# Patient Record
Sex: MALE | Race: BLACK OR AFRICAN AMERICAN | URBAN - METROPOLITAN AREA
[De-identification: names, ages, dates, MRNs, and addresses within clinical notes are randomized per-mention and may not be internally consistent; named-entity substitution may affect disease eponyms.]

---

## 2019-07-08 ENCOUNTER — EMERGENCY (EMERGENCY)
Facility: HOSPITAL | Age: 44
LOS: 1 days | Discharge: HOME | End: 2019-07-08
Attending: EMERGENCY MEDICINE | Admitting: EMERGENCY MEDICINE
Payer: COMMERCIAL

## 2019-07-08 VITALS
HEART RATE: 114 BPM | SYSTOLIC BLOOD PRESSURE: 191 MMHG | OXYGEN SATURATION: 97 % | DIASTOLIC BLOOD PRESSURE: 125 MMHG | TEMPERATURE: 98 F

## 2019-07-08 VITALS
HEART RATE: 83 BPM | DIASTOLIC BLOOD PRESSURE: 100 MMHG | RESPIRATION RATE: 15 BRPM | OXYGEN SATURATION: 100 % | SYSTOLIC BLOOD PRESSURE: 158 MMHG | TEMPERATURE: 97 F

## 2019-07-08 LAB
ALBUMIN SERPL ELPH-MCNC: 4.4 G/DL — SIGNIFICANT CHANGE UP (ref 3.3–5)
ALP SERPL-CCNC: 42 U/L — SIGNIFICANT CHANGE UP (ref 40–120)
ALT FLD-CCNC: 22 U/L — SIGNIFICANT CHANGE UP (ref 4–41)
ANION GAP SERPL CALC-SCNC: 11 MMO/L — SIGNIFICANT CHANGE UP (ref 7–14)
AST SERPL-CCNC: 25 U/L — SIGNIFICANT CHANGE UP (ref 4–40)
BASOPHILS # BLD AUTO: 0.05 K/UL — SIGNIFICANT CHANGE UP (ref 0–0.2)
BASOPHILS NFR BLD AUTO: 0.6 % — SIGNIFICANT CHANGE UP (ref 0–2)
BILIRUB SERPL-MCNC: 0.5 MG/DL — SIGNIFICANT CHANGE UP (ref 0.2–1.2)
BUN SERPL-MCNC: 11 MG/DL — SIGNIFICANT CHANGE UP (ref 7–23)
CALCIUM SERPL-MCNC: 9.4 MG/DL — SIGNIFICANT CHANGE UP (ref 8.4–10.5)
CHLORIDE SERPL-SCNC: 103 MMOL/L — SIGNIFICANT CHANGE UP (ref 98–107)
CO2 SERPL-SCNC: 25 MMOL/L — SIGNIFICANT CHANGE UP (ref 22–31)
CREAT SERPL-MCNC: 1.4 MG/DL — HIGH (ref 0.5–1.3)
D DIMER BLD IA.RAPID-MCNC: < 150 NG/ML — SIGNIFICANT CHANGE UP
EOSINOPHIL # BLD AUTO: 0.19 K/UL — SIGNIFICANT CHANGE UP (ref 0–0.5)
EOSINOPHIL NFR BLD AUTO: 2.2 % — SIGNIFICANT CHANGE UP (ref 0–6)
GLUCOSE SERPL-MCNC: 103 MG/DL — HIGH (ref 70–99)
HCT VFR BLD CALC: 39.4 % — SIGNIFICANT CHANGE UP (ref 39–50)
HGB BLD-MCNC: 13.1 G/DL — SIGNIFICANT CHANGE UP (ref 13–17)
IMM GRANULOCYTES NFR BLD AUTO: 0.6 % — SIGNIFICANT CHANGE UP (ref 0–1.5)
LYMPHOCYTES # BLD AUTO: 2.45 K/UL — SIGNIFICANT CHANGE UP (ref 1–3.3)
LYMPHOCYTES # BLD AUTO: 28.9 % — SIGNIFICANT CHANGE UP (ref 13–44)
MAGNESIUM SERPL-MCNC: 2 MG/DL — SIGNIFICANT CHANGE UP (ref 1.6–2.6)
MCHC RBC-ENTMCNC: 26.5 PG — LOW (ref 27–34)
MCHC RBC-ENTMCNC: 33.2 % — SIGNIFICANT CHANGE UP (ref 32–36)
MCV RBC AUTO: 79.8 FL — LOW (ref 80–100)
MONOCYTES # BLD AUTO: 0.82 K/UL — SIGNIFICANT CHANGE UP (ref 0–0.9)
MONOCYTES NFR BLD AUTO: 9.7 % — SIGNIFICANT CHANGE UP (ref 2–14)
NEUTROPHILS # BLD AUTO: 4.92 K/UL — SIGNIFICANT CHANGE UP (ref 1.8–7.4)
NEUTROPHILS NFR BLD AUTO: 58 % — SIGNIFICANT CHANGE UP (ref 43–77)
NRBC # FLD: 0 K/UL — SIGNIFICANT CHANGE UP (ref 0–0)
NT-PROBNP SERPL-SCNC: 7.34 PG/ML — SIGNIFICANT CHANGE UP
PHOSPHATE SERPL-MCNC: 2.9 MG/DL — SIGNIFICANT CHANGE UP (ref 2.5–4.5)
PLATELET # BLD AUTO: 290 K/UL — SIGNIFICANT CHANGE UP (ref 150–400)
PMV BLD: 9.9 FL — SIGNIFICANT CHANGE UP (ref 7–13)
POTASSIUM SERPL-MCNC: 3.6 MMOL/L — SIGNIFICANT CHANGE UP (ref 3.5–5.3)
POTASSIUM SERPL-SCNC: 3.6 MMOL/L — SIGNIFICANT CHANGE UP (ref 3.5–5.3)
PROT SERPL-MCNC: 7.1 G/DL — SIGNIFICANT CHANGE UP (ref 6–8.3)
RBC # BLD: 4.94 M/UL — SIGNIFICANT CHANGE UP (ref 4.2–5.8)
RBC # FLD: 13.1 % — SIGNIFICANT CHANGE UP (ref 10.3–14.5)
SODIUM SERPL-SCNC: 139 MMOL/L — SIGNIFICANT CHANGE UP (ref 135–145)
TROPONIN T, HIGH SENSITIVITY: 8 NG/L — SIGNIFICANT CHANGE UP (ref ?–14)
TROPONIN T, HIGH SENSITIVITY: 8 NG/L — SIGNIFICANT CHANGE UP (ref ?–14)
WBC # BLD: 8.48 K/UL — SIGNIFICANT CHANGE UP (ref 3.8–10.5)
WBC # FLD AUTO: 8.48 K/UL — SIGNIFICANT CHANGE UP (ref 3.8–10.5)

## 2019-07-08 PROCEDURE — 71046 X-RAY EXAM CHEST 2 VIEWS: CPT | Mod: 26

## 2019-07-08 PROCEDURE — 99284 EMERGENCY DEPT VISIT MOD MDM: CPT

## 2019-07-08 RX ORDER — SODIUM CHLORIDE 9 MG/ML
1000 INJECTION INTRAMUSCULAR; INTRAVENOUS; SUBCUTANEOUS ONCE
Refills: 0 | Status: COMPLETED | OUTPATIENT
Start: 2019-07-08 | End: 2019-07-08

## 2019-07-08 RX ADMIN — SODIUM CHLORIDE 1000 MILLILITER(S): 9 INJECTION INTRAMUSCULAR; INTRAVENOUS; SUBCUTANEOUS at 06:41

## 2019-07-08 NOTE — ED ADULT NURSE NOTE - CHIEF COMPLAINT QUOTE
Pt comes in for c/o SOB after getting into an argument while at work. Pt vs as noted, reports he takes BP meds, but forgot to take it yesterday d/t being very tired. Pt denies any pain, EKG in progress.

## 2019-07-08 NOTE — ED ADULT NURSE REASSESSMENT NOTE - NS ED NURSE REASSESS COMMENT FT1
second cardiac enzyme obtained and sent to lab. pt offers no complaints currently, will cont to monitor.

## 2019-07-08 NOTE — ED PROVIDER NOTE - ATTENDING CONTRIBUTION TO CARE
MD Vee:  patient seen and evaluated with the resident.  I was present for key portions of the History & Physical, and I agree with the Impression & Plan.  MD Vee: MD Vee:  patient seen and evaluated with the resident.  I was present for key portions of the History & Physical, and I agree with the Impression & Plan.  MD Vee:  45 yo M, c/o transient episode of SOB.  Onset: while having heated verbal argument with his supervisor.  Quality: tachypnea.  Associated Sx: no CP, no back pain, no cough.  +MHx of HTN and YAQUELIN.    VS:  ++HTN and tachycardia (resolving w/o intervention).  VS: wnl.  Physical Exam: adult M, NAD, NCAT, PERRL, EOMI, neck supple, RRR, CTA B, Abd: s/nd/nt, Ext: no edema, Neuro: AAOx3, ambulates w/o diff, strength 5/5 & symmetric throughout.  Impression:  SOB that may be related to panic attack.  Hx of HTN and persistent tachycardia raise concern for PE, but ddimer negative.  ECG = sinus tachycardia.   Plan:  delta trop, reassess.  If negative, would d/c home to f/u with PMD, & strict return precautions.

## 2019-07-08 NOTE — ED PROVIDER NOTE - OBJECTIVE STATEMENT
44 year-old male with history of HTN, sleep apnea presents to the Emergency Department for shortness of breath.  Patient mentions symptoms started approx. 1.5 hours ago shortly after walking back from break after having an argument with his supervisor.  + SOB.  No fevers, chills, nausea, vomiting, chest pain, abdominal pain, LOC, BIS.  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  Patient reports being compliant on his CPAP.

## 2019-07-08 NOTE — ED ADULT NURSE REASSESSMENT NOTE - NS ED NURSE REASSESS COMMENT FT1
report received from NORBERTO SHANE. pt aox4, ambulatory without assistance. NSR on HM. currently denies SOB and CP. pt appears comfortable. vss, nad. will cont to monitor.

## 2019-07-08 NOTE — ED PROVIDER NOTE - PHYSICAL EXAMINATION
*Gen: NAD, AAO*3  *HEENT: NC/AT, MMM, airway patent, trachea midline  *CV: tachycardia, S1/S2 present, no murmurs/rubs/gallops  *Resp: no respiratory distress, LCTAB, no wheezing/rales/rhonchi  *Abd: non-distended, soft N/Tx4, no guarding or rigidity  *Neuro: no focal neuro deficits, moving all limbs appropriately  *Extremities: no gross deformity  *Skin: no rashes, no wounds   ~ Linda Aranda M.D.

## 2019-07-08 NOTE — ED ADULT TRIAGE NOTE - CHIEF COMPLAINT QUOTE
Pt comes in for c/o SOB after getting into an argument Pt comes in for c/o SOB after getting into an argument while at work. Pt vs as noted, reports he takes BP meds, but forgot to take it yesterday d/t being very tired. Pt denies any pain, EKG in progress.

## 2019-07-08 NOTE — ED PROVIDER NOTE - SHIFT CHANGE DETAILS
Impression:  SOB that may be related to panic attack.  Hx of HTN and persistent tachycardia raise concern for PE, but ddimer negative.  ECG = sinus tachycardia.   Plan:  delta trop, reassess.  If negative & VS improved, would d/c home to f/u with PMD, & strict return precautions.

## 2019-07-08 NOTE — ED PROVIDER NOTE - NSFOLLOWUPINSTRUCTIONS_ED_ALL_ED_FT
You were seen in the Emergency Department for shortness of breath.  1) Advance activity as tolerated.    2) Continue all previously prescribed medications as directed.    3) Follow up with your primary care physician in 24-48 hours - take copies of your results.    4) Return to the Emergency Department for worsening or persistent symptoms, and/or ANY NEW OR CONCERNING SYMPTOMS. If you have issues obtaining follow up, please call: [phone] to obtain a doctor or specialist who takes your insurance in your area.

## 2019-07-08 NOTE — ED ADULT NURSE REASSESSMENT NOTE - NS ED NURSE REASSESS COMMENT FT1
Pt dc by MD. IV DC as per hospital protocol by MD.   Pt expressed understanding of DC instructions.  Ambulated to exit with steady gait.

## 2019-07-08 NOTE — ED PROVIDER NOTE - PROGRESS NOTE DETAILS
Thong Vyas M.D. Resident: Pt reevaluated, currently denies CP, SOB, discussed lab results, CXR, and EKG with patient, advised to follow up with his PMD this week, discussed return precautions

## 2019-07-08 NOTE — ED ADULT NURSE NOTE - OBJECTIVE STATEMENT
Received pt in room 5. pt A&Ox3, ambulatory. Pt comes in c/o SOB after getting into an argument while at work around 5am. Pt appears stable, comfortable and in no apparent distress. Pt noted to be hypertensive. Hx. HTN. Reports that he takes BP medications, but forgot to take it yesterday due to feeling tired. vitals as noted. Airway patent, pt speaking in full sentences. respirations equal nonlabored, no respiratory distress noted, sating 98% on room air. Denies any other medical complaints. Denies chest pain, palpitations, abdominal pain, n/v/d, cough, headache, dizziness, blurry vision, fever. Pt stable, awaiting further plan

## 2019-07-08 NOTE — ED PROVIDER NOTE - CLINICAL SUMMARY MEDICAL DECISION MAKING FREE TEXT BOX
44 year-old male with history of HTN, sleep apnea presents to the Emergency Department for shortness of breath; rule-out ACS, PNA, PE with labs and imaging.   Unlikely dissection given clinical picture.

## 2020-04-26 ENCOUNTER — MESSAGE (OUTPATIENT)
Age: 45
End: 2020-04-26

## 2020-05-06 LAB
SARS-COV-2 IGG SERPL IA-ACNC: 7.7 RATIO
SARS-COV-2 IGG SERPL QL IA: POSITIVE

## 2022-01-14 NOTE — ED ADULT TRIAGE NOTE - ESI TRIAGE ACUITY LEVEL, MLM
3 [FreeTextEntry1] : Pt is alert. Maintains eye contact, cooperate. follows commands. Speech is clear and fluent\par EOM intact, no facial asymmetry present, swallows secretions. strong shoulder shrug.shoulders even set at rest\par tongue midline.\par optho- EOMin tact \par Motor: able to raise arms high above head without difficulty. antigravity movements intact. gait appeared steady limited due to limited spce \par Sensory-unable to exam due to telehealth visit\par Coordination: normal rapid alternate movement \par \par \par

## 2023-02-19 NOTE — ED ADULT NURSE NOTE - DOES PATIENT HAVE ADVANCE DIRECTIVE
seen the pt at the bed side last night felt better - pending CT scan and ua . pt was sun downing over night that sleep with melatonin - no other compliant.  called CT to take for CT scan No